# Patient Record
Sex: FEMALE | Race: WHITE
[De-identification: names, ages, dates, MRNs, and addresses within clinical notes are randomized per-mention and may not be internally consistent; named-entity substitution may affect disease eponyms.]

---

## 2017-11-05 ENCOUNTER — HOSPITAL ENCOUNTER (INPATIENT)
Dept: HOSPITAL 83 - ED | Age: 52
LOS: 2 days | Discharge: HOME | DRG: 392 | End: 2017-11-07
Attending: INTERNAL MEDICINE | Admitting: INTERNAL MEDICINE
Payer: COMMERCIAL

## 2017-11-05 VITALS — SYSTOLIC BLOOD PRESSURE: 143 MMHG | DIASTOLIC BLOOD PRESSURE: 88 MMHG

## 2017-11-05 VITALS — DIASTOLIC BLOOD PRESSURE: 92 MMHG

## 2017-11-05 VITALS
DIASTOLIC BLOOD PRESSURE: 82 MMHG | HEIGHT: 65.98 IN | BODY MASS INDEX: 21.91 KG/M2 | SYSTOLIC BLOOD PRESSURE: 134 MMHG | WEIGHT: 136.31 LBS

## 2017-11-05 VITALS — DIASTOLIC BLOOD PRESSURE: 79 MMHG | SYSTOLIC BLOOD PRESSURE: 123 MMHG

## 2017-11-05 DIAGNOSIS — Z87.442: ICD-10-CM

## 2017-11-05 DIAGNOSIS — E89.0: ICD-10-CM

## 2017-11-05 DIAGNOSIS — Z98.51: ICD-10-CM

## 2017-11-05 DIAGNOSIS — C44.509: ICD-10-CM

## 2017-11-05 DIAGNOSIS — N39.0: ICD-10-CM

## 2017-11-05 DIAGNOSIS — R19.00: ICD-10-CM

## 2017-11-05 DIAGNOSIS — Z83.3: ICD-10-CM

## 2017-11-05 DIAGNOSIS — K63.89: ICD-10-CM

## 2017-11-05 DIAGNOSIS — Z72.89: ICD-10-CM

## 2017-11-05 DIAGNOSIS — Z90.49: ICD-10-CM

## 2017-11-05 DIAGNOSIS — Z79.899: ICD-10-CM

## 2017-11-05 DIAGNOSIS — Z82.49: ICD-10-CM

## 2017-11-05 DIAGNOSIS — D72.819: ICD-10-CM

## 2017-11-05 DIAGNOSIS — Z83.49: ICD-10-CM

## 2017-11-05 DIAGNOSIS — C73: ICD-10-CM

## 2017-11-05 DIAGNOSIS — R10.9: Primary | ICD-10-CM

## 2017-11-05 LAB
ALBUMIN SERPL-MCNC: 4.2 GM/DL (ref 3.1–4.5)
ALP SERPL-CCNC: 87 U/L (ref 45–117)
ALT SERPL W P-5'-P-CCNC: 18 U/L (ref 12–78)
APPEARANCE UR: CLEAR
AST SERPL-CCNC: 17 IU/L (ref 3–35)
BACTERIA #/AREA URNS HPF: (no result) /[HPF]
BASOPHILS # BLD AUTO: 0 10*3/UL (ref 0–0.1)
BASOPHILS NFR BLD AUTO: 0.6 % (ref 0–1)
BILIRUB UR QL STRIP: NEGATIVE
BUN SERPL-MCNC: 16 MG/DL (ref 7–24)
CHLORIDE SERPL-SCNC: 104 MMOL/L (ref 98–107)
COLOR UR: YELLOW
CREAT SERPL-MCNC: 0.95 MG/DL (ref 0.55–1.02)
EOSINOPHIL # BLD AUTO: 0.1 10*3/UL (ref 0–0.4)
EOSINOPHIL # BLD AUTO: 1.3 % (ref 1–4)
EPI CELLS #/AREA URNS HPF: (no result) /[HPF]
ERYTHROCYTE [DISTWIDTH] IN BLOOD BY AUTOMATED COUNT: 11.9 % (ref 0–14.5)
GLUCOSE UR QL: NEGATIVE
HCT VFR BLD AUTO: 45.8 % (ref 37–47)
HGB BLD-MCNC: 14.6 G/DL (ref 12–16)
HGB UR QL STRIP: NEGATIVE
KETONES UR QL STRIP: NEGATIVE
LEUKOCYTE ESTERASE UR QL STRIP: (no result)
LYMPHOCYTES # BLD AUTO: 1.9 10*3/UL (ref 1.3–4.4)
LYMPHOCYTES NFR BLD AUTO: 30.5 % (ref 27–41)
MCH RBC QN AUTO: 30.2 PG (ref 27–31)
MCHC RBC AUTO-ENTMCNC: 31.9 G/DL (ref 33–37)
MCV RBC AUTO: 94.6 FL (ref 81–99)
MONOCYTES # BLD AUTO: 0.5 10*3/UL (ref 0.1–1)
MONOCYTES NFR BLD MANUAL: 8 % (ref 3–9)
NEUT #: 3.7 10*3/UL (ref 2.3–7.9)
NEUT %: 59.3 % (ref 47–73)
NITRITE UR QL STRIP: NEGATIVE
NRBC BLD QL AUTO: 0 10*3/UL (ref 0–0)
PH UR STRIP: 6 [PH] (ref 5–9)
PLATELET # BLD AUTO: 210 10*3/UL (ref 130–400)
PMV BLD AUTO: 11.1 FL (ref 9.6–12.3)
POTASSIUM SERPL-SCNC: 4 MMOL/L (ref 3.5–5.1)
PROT SERPL-MCNC: 7.8 GM/DL (ref 6.4–8.2)
RBC # BLD AUTO: 4.84 10*6/UL (ref 4.1–5.1)
RBC #/AREA URNS HPF: (no result) RBC/HPF (ref 0–2)
SODIUM SERPL-SCNC: 140 MMOL/L (ref 136–145)
SP GR UR: 1.01 (ref 1–1.03)
UROBILINOGEN UR STRIP-MCNC: 0.2 E.U./DL (ref 0.2–1)
WBC NRBC COR # BLD AUTO: 6.2 10*3/UL (ref 4.8–10.8)

## 2017-11-05 PROCEDURE — 0DJD8ZZ INSPECTION OF LOWER INTESTINAL TRACT, VIA NATURAL OR ARTIFICIAL OPENING ENDOSCOPIC: ICD-10-PCS | Performed by: INTERNAL MEDICINE

## 2017-11-05 NOTE — CON
[place], [place]
 
                                 REPORT OF CONSULTATION
 
        NAME: LASHAE SORENSEN                 ACCT #: [id number]  
        UNIT #: [id number]                         ROOM: 522       
        DOCTOR: LENA VANN MD              BIRTHDATE: [birth year]
 
 
DOS: 11/06/2017
 
HISTORY OF PRESENT ILLNESS:  This is a 51-year-old patient who presented with
chief complaint of lower abdominal pain for 2 days.  The patient was admitted to
the Emergency Room.  A CT scan of the abdomen was obtained.  The patient was
suspected to have a mass in right colon.  I have been asked for assessment of
the patient in this regard.
 
PAST MEDICAL HISTORY:  Associated hypothyroidism.
 
PAST SURGICAL HISTORY:  Thyroidectomy and appendectomy.
 
ALLERGIES:  No known medication.
 
SOCIAL HISTORY:  Nonsmoker and social alcohol consumer.
 
FAMILY HISTORY:  Noncontributory.
 
REVIEW OF SYSTEMS:
HEENT:  Denies double vision, blurred vision.
RESPIRATORY:  Denies acute shortness of breath.
CARDIOVASCULAR:  Denies acute chest pain.
DIGESTIVE SYSTEM:  As identified above.  No changes in bowel habit.  No
hematemesis, no hematochezia.
 
PHYSICAL EXAMINATION:
VITAL SIGNS:  Stable.
HEENT:  Within normal limits.
NECK:  Supple, no thyromegaly, no cervical lymphadenopathy.
CHEST:  Symmetric anatomy, equal expansion.  No wheeze.  No rhonchi.
HEART:  Normal sinus rhythm, no gallop, no murmur.
ABDOMEN:  Soft.  No hepato-organomegaly.  Bowel sounds within normal limits.
EXTREMITIES:  No cyanosis, no pedal edema.
NEUROLOGIC:  Alert, oriented to time, place and person.
 
IMPRESSION:  Abnormal CT scan report mass seen at the level of the cecum,
malignancy has been of concern and the colonoscopy was recommended; therefore,
we are consulted and answer was needed today.  Labs reviewed and records
reviewed.  Her hCG was negative.  H and H were stable.  Her urine cultures
showed no growth.
 
IMPRESSION AND PLAN:  Ruling out mass in cecum, hypothyroidism.  Labs reviewed. 
Medications reviewed, records reviewed.  Plan for colonoscopy today.
 
 
 
 
                              [place], [place]
 
                                 REPORT OF CONSULTATION
 
        NAME: LASHAE SORENSEN                 ACCT #: [id number]  
        UNIT #: [id number]                         ROOM: 522       
        DOCTOR: LENA VANN MD              BIRTHDATE: [birth year]
 
 
_________________________________
LENA VANN MD
 
CM:CONSTR:REPORT OF CONSULTATION
 
D: 11/06/17 1701   T: 11/07/17 11/13/17 1710                                          interface

## 2017-11-05 NOTE — NUR
SPOKE TO  AT THIS TIME REGARDING CONSULT. DISCUSSED RESULTS OF CT OF
ABD/PELVIS INCLUDING CECAL MASS MEASURING 2.7 X 1.8 X 4.8 CM. ALSO DISCUSSED
LABS AND HOME MEDICATIONS. INSTRUCTED TO CALL  WITH RESULTS OF URINE
CULTURE WHEN IT COMES BACK.  AWARE THAT PRELIMINARY CULTURE RESULTS
GENERALLY TAKE 48 HOURS TO RETURN.

## 2017-11-05 NOTE — NUR
Time: 2100
A 51  year old FEMALE admitted to 5E
under services of CECIL SQUIRES DO.
Pt. arrived via wheel chair from
ER.  Chief complaint: ABDOMINAL PAIN, ABDOMINAL MASS.
 
LADAN BRASWELL

## 2017-11-05 NOTE — NUR
PT MEDICATED WITH PO NORCO PER PRN ORDER FOR C/O LOWER ABDOMINAL PAIN 7/10.
PATIENT STATES FEELING IS A CONSTANT DULL ACHE/UNCOMFORTABLE FEELING WITH
INTERMITTENT SHARP PAINS. WILL MONITOR EFFECTIVENESS OF MEDICATIONS. IV
DILAUDID OFFERED PER PRN ORDER, BUT PATIENT STATES SHE DOES NOT WANT TO TAKE
SOMETHING SO STRONG AT THIS TIME. CALL LIGHT LEFT IN REACH.

## 2017-11-05 NOTE — NUR
PATIENT RESTING IN BED WITH FAMILY AT BEDSIDE STATES NO CHANGE IN ABDOMINAL
PAIN, PT A&O X3, DENIES NAUSEA OR VOMITING, CONSTANT SHARP PAIN THAT VARIES IN
INTENCITY

## 2017-11-05 NOTE — NUR
PATIENT STATES EARLIER MEDICATION WAS EFFECTIVE. WILL CONTINUE TO MONITOR.
CALL LIGHT LEFT IN REACH.

## 2017-11-06 VITALS — SYSTOLIC BLOOD PRESSURE: 130 MMHG | DIASTOLIC BLOOD PRESSURE: 88 MMHG

## 2017-11-06 VITALS — SYSTOLIC BLOOD PRESSURE: 120 MMHG | DIASTOLIC BLOOD PRESSURE: 69 MMHG

## 2017-11-06 VITALS — SYSTOLIC BLOOD PRESSURE: 123 MMHG | DIASTOLIC BLOOD PRESSURE: 79 MMHG

## 2017-11-06 VITALS — DIASTOLIC BLOOD PRESSURE: 69 MMHG | SYSTOLIC BLOOD PRESSURE: 125 MMHG

## 2017-11-06 VITALS — DIASTOLIC BLOOD PRESSURE: 60 MMHG | SYSTOLIC BLOOD PRESSURE: 90 MMHG

## 2017-11-06 VITALS — SYSTOLIC BLOOD PRESSURE: 112 MMHG | DIASTOLIC BLOOD PRESSURE: 70 MMHG

## 2017-11-06 VITALS — DIASTOLIC BLOOD PRESSURE: 73 MMHG

## 2017-11-06 VITALS — DIASTOLIC BLOOD PRESSURE: 76 MMHG

## 2017-11-06 VITALS — DIASTOLIC BLOOD PRESSURE: 70 MMHG

## 2017-11-06 LAB
25(OH)D3 SERPL-MCNC: 39 NG/ML (ref 30–100)
APTT PPP: 25.3 SECONDS (ref 20.8–31.5)
BASOPHILS # BLD AUTO: 0 10*3/UL (ref 0–0.1)
BASOPHILS NFR BLD AUTO: 0.7 % (ref 0–1)
BUN SERPL-MCNC: 15 MG/DL (ref 7–24)
CHLORIDE SERPL-SCNC: 103 MMOL/L (ref 98–107)
CHOLEST SERPL-MCNC: 175 MG/DL (ref ?–200)
CREAT SERPL-MCNC: 0.95 MG/DL (ref 0.55–1.02)
EOSINOPHIL # BLD AUTO: 0.1 10*3/UL (ref 0–0.4)
EOSINOPHIL # BLD AUTO: 2.4 % (ref 1–4)
ERYTHROCYTE [DISTWIDTH] IN BLOOD BY AUTOMATED COUNT: 11.9 % (ref 0–14.5)
HCT VFR BLD AUTO: 41.6 % (ref 37–47)
HDLC SERPL-MCNC: 58 MG/DL (ref 40–60)
HGB BLD-MCNC: 13.3 G/DL (ref 12–16)
INR BLD: 1 (ref 2–3.5)
LDLC SERPL DIRECT ASSAY-MCNC: 105 MG/DL (ref 9–159)
LYMPHOCYTES # BLD AUTO: 1.6 10*3/UL (ref 1.3–4.4)
LYMPHOCYTES NFR BLD AUTO: 38.3 % (ref 27–41)
MCH RBC QN AUTO: 30 PG (ref 27–31)
MCHC RBC AUTO-ENTMCNC: 32 G/DL (ref 33–37)
MCV RBC AUTO: 93.7 FL (ref 81–99)
MONOCYTES # BLD AUTO: 0.4 10*3/UL (ref 0.1–1)
MONOCYTES NFR BLD MANUAL: 9.7 % (ref 3–9)
NEUT #: 2 10*3/UL (ref 2.3–7.9)
NEUT %: 48.7 % (ref 47–73)
NRBC BLD QL AUTO: 0 % (ref 0–0)
PLATELET # BLD AUTO: 173 10*3/UL (ref 130–400)
PMV BLD AUTO: 11.7 FL (ref 9.6–12.3)
POTASSIUM SERPL-SCNC: 4 MMOL/L (ref 3.5–5.1)
RBC # BLD AUTO: 4.44 10*6/UL (ref 4.1–5.1)
SODIUM SERPL-SCNC: 140 MMOL/L (ref 136–145)
TRIGL SERPL-MCNC: 58 MG/DL (ref ?–150)
TSH SERPL DL<=0.005 MIU/L-ACNC: 0.06 UIU/ML (ref 0.36–4.75)
VITAMIN B12: 365 PG/ML (ref 247–911)
VLDLC SERPL CALC-MCNC: 12 MG/DL (ref 6–40)
WBC NRBC COR # BLD AUTO: 4.1 10*3/UL (ref 4.8–10.8)

## 2017-11-06 NOTE — NUR
in to talk to patient.
Patient states lives at home with family.
There are no steps in the home.
Physician: ramos smith
Pharmacy: [facility]
Home health services: none
Patient's level of ADLs: INDEPENDENT
Patient has working utilities: all working
DME: none
Follow-up physician's appointment after d/c: will be made by hospitalist nurse
director upon discharge
Does patient want to access PORTAL?: no
Discharge plan discussed with patient patient lives at home, is independent in
adls and ambulation, patient will be going home when able and denies any home
needs.
 
WAYLON CANDELARIA

## 2017-11-06 NOTE — NUR
RESTING QUIETLY, DILAUDID EFFECTIVE FOR PAIN BUT PT STATES SHE DID NOT LIKE
THE WAY IT MADE HERE FEEL.  DR QUIÑONEZ HAS PUT ORDER IN FOR TORODAL.

## 2017-11-06 NOTE — NUR
MEDICATED IV DILAUDID FOR C/O ABDOMINAL PAIN.  RATES PAIN AS 8/10.  PT
IMMEDIATELY STATES HER HEAD FEELS FUNNY AND LIGHTHEADINESS.  /50.  WILL
CONTINUE TO MONITOR.

## 2017-11-06 NOTE — NUR
SPOKE TO  AT THIS TIME.  WANTS PATIENT TO HAVE 25 MG DULCOLAX
PO NOW, FOLLOWED  MG MIRALAX PREP IN 2L OF GATORADE. ALSO INSTRUCTED TO
HOLD ALL BLOOD THINNERS, KEEP PT NPO AFTER MIRALAX PREP, AND CALL  BY
11 AM REGARDING RESULTS OF COLO PREP. POSSIBLE COLO DEPENDING ON RESPONSE TO
PREP.

## 2017-11-06 NOTE — NUR
DR VANN NOTIFIED THAT PT IS ALMOST FINISHED WITH COLO PREP AND IS HAVING
DIARRHEA THAT IS GETTING LIGHTER IN COLOR PER PT.

## 2017-11-07 VITALS — SYSTOLIC BLOOD PRESSURE: 125 MMHG | DIASTOLIC BLOOD PRESSURE: 74 MMHG

## 2017-11-07 VITALS — DIASTOLIC BLOOD PRESSURE: 86 MMHG

## 2017-11-07 VITALS — DIASTOLIC BLOOD PRESSURE: 56 MMHG

## 2017-11-07 VITALS — DIASTOLIC BLOOD PRESSURE: 72 MMHG

## 2017-11-07 LAB
ALBUMIN SERPL-MCNC: 3.6 GM/DL (ref 3.1–4.5)
ALP SERPL-CCNC: 69 U/L (ref 45–117)
ALT SERPL W P-5'-P-CCNC: 16 U/L (ref 12–78)
AST SERPL-CCNC: 14 IU/L (ref 3–35)
BASOPHILS # BLD AUTO: 0 10*3/UL (ref 0–0.1)
BASOPHILS NFR BLD AUTO: 0.7 % (ref 0–1)
BUN SERPL-MCNC: 15 MG/DL (ref 7–24)
CHLORIDE SERPL-SCNC: 105 MMOL/L (ref 98–107)
CREAT SERPL-MCNC: 0.82 MG/DL (ref 0.55–1.02)
EOSINOPHIL # BLD AUTO: 0.1 10*3/UL (ref 0–0.4)
EOSINOPHIL # BLD AUTO: 2.7 % (ref 1–4)
ERYTHROCYTE [DISTWIDTH] IN BLOOD BY AUTOMATED COUNT: 11.7 % (ref 0–14.5)
HCT VFR BLD AUTO: 41.5 % (ref 37–47)
HGB BLD-MCNC: 13.5 G/DL (ref 12–16)
LYMPHOCYTES # BLD AUTO: 1.3 10*3/UL (ref 1.3–4.4)
LYMPHOCYTES NFR BLD AUTO: 31.5 % (ref 27–41)
MCH RBC QN AUTO: 30.7 PG (ref 27–31)
MCHC RBC AUTO-ENTMCNC: 32.5 G/DL (ref 33–37)
MCV RBC AUTO: 94.3 FL (ref 81–99)
MONOCYTES # BLD AUTO: 0.3 10*3/UL (ref 0.1–1)
MONOCYTES NFR BLD MANUAL: 8 % (ref 3–9)
NEUT #: 2.3 10*3/UL (ref 2.3–7.9)
NEUT %: 56.9 % (ref 47–73)
NRBC BLD QL AUTO: 0 10*3/UL (ref 0–0)
PLATELET # BLD AUTO: 174 10*3/UL (ref 130–400)
PMV BLD AUTO: 11.4 FL (ref 9.6–12.3)
POTASSIUM SERPL-SCNC: 4.7 MMOL/L (ref 3.5–5.1)
PROT SERPL-MCNC: 6.6 GM/DL (ref 6.4–8.2)
RBC # BLD AUTO: 4.4 10*6/UL (ref 4.1–5.1)
SODIUM SERPL-SCNC: 139 MMOL/L (ref 136–145)
WBC NRBC COR # BLD AUTO: 4.1 10*3/UL (ref 4.8–10.8)

## 2017-11-07 NOTE — NUR
DR VANN CALLED WITH PELVIC US RESULTS. NO NEW ORDERS. OK TO DC FROM HIM.
FOLLOW UP IN OFFICE AS OUT PATIENT.

## 2017-11-07 NOTE — NUR
Patient resting quietly with no c/o discomfort. Respirations easy and regular.
Vital signs stable. No overt distress.
TARYN BEACH R

## 2017-11-07 NOTE — NUR
DR VANN CALLED AND NOTIFIED OF URINE CULTURE RESULT. UPDATED THAT PT
TOLERATED DIET WITHOUT PROBLEM AND ORDERING OF PELVIC US FOR TODAY. DR VANN
WANTS CALLED WITH RESULT.

## 2017-11-07 NOTE — NUR
Discharge instructions reviewed with patient/family. Patient receptive and
verbalizes understanding. Follow-up care arranged. Written instructions given
to patient/family.
TARYN BEACH

## 2017-11-07 NOTE — NUR
DR QUIÑONEZ NOTIFIED OF PATIENTS IV BEING PULLED OUT AND PT REQUEST AT THIS TIME
NOT TO HAVE ANOTHER IV PLACED. DR QUIÑONEZ STATES THAT IS OK.

## 2017-12-08 ENCOUNTER — HOSPITAL ENCOUNTER (OUTPATIENT)
Dept: HOSPITAL 83 - LAB | Age: 52
Discharge: HOME | End: 2017-12-08
Attending: INTERNAL MEDICINE
Payer: COMMERCIAL

## 2017-12-08 DIAGNOSIS — E89.0: ICD-10-CM

## 2017-12-08 DIAGNOSIS — C73: Primary | ICD-10-CM

## 2018-12-12 ENCOUNTER — HOSPITAL ENCOUNTER (OUTPATIENT)
Dept: HOSPITAL 83 - LAB | Age: 53
Discharge: HOME | End: 2018-12-12
Attending: INTERNAL MEDICINE
Payer: COMMERCIAL

## 2018-12-12 DIAGNOSIS — E89.0: ICD-10-CM

## 2018-12-12 DIAGNOSIS — C73: Primary | ICD-10-CM

## 2019-12-11 ENCOUNTER — HOSPITAL ENCOUNTER (OUTPATIENT)
Dept: HOSPITAL 83 - LAB | Age: 54
Discharge: HOME | End: 2019-12-11
Attending: INTERNAL MEDICINE
Payer: COMMERCIAL

## 2019-12-11 DIAGNOSIS — C73: Primary | ICD-10-CM

## 2019-12-11 DIAGNOSIS — E89.0: ICD-10-CM

## 2019-12-13 ENCOUNTER — HOSPITAL ENCOUNTER (OUTPATIENT)
Dept: HOSPITAL 83 - US | Age: 54
Discharge: HOME | End: 2019-12-13
Attending: INTERNAL MEDICINE
Payer: COMMERCIAL

## 2019-12-13 DIAGNOSIS — R10.84: Primary | ICD-10-CM

## 2020-06-21 ENCOUNTER — HOSPITAL ENCOUNTER (EMERGENCY)
Dept: HOSPITAL 83 - ED | Age: 55
Discharge: HOME | End: 2020-06-21
Payer: COMMERCIAL

## 2020-06-21 VITALS — HEIGHT: 55 IN

## 2020-06-21 VITALS — DIASTOLIC BLOOD PRESSURE: 88 MMHG
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
## 2020-06-21 DIAGNOSIS — J45.909: ICD-10-CM

## 2020-06-21 DIAGNOSIS — S01.452A: Primary | ICD-10-CM

## 2020-06-21 DIAGNOSIS — Z79.899: ICD-10-CM

## 2020-06-21 DIAGNOSIS — Z79.2: ICD-10-CM
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
## 2021-04-22 ENCOUNTER — HOSPITAL ENCOUNTER (OUTPATIENT)
Dept: HOSPITAL 83 - LAB | Age: 56
Discharge: HOME | End: 2021-04-22
Attending: INTERNAL MEDICINE
Payer: COMMERCIAL

## 2021-04-22 DIAGNOSIS — C73: Primary | ICD-10-CM

## 2021-04-22 DIAGNOSIS — E89.0: ICD-10-CM

## 2021-04-28 LAB — THYROGLOB AB SERPL-ACNC: <1 IU/ML

## 2022-09-16 ENCOUNTER — HOSPITAL ENCOUNTER (OUTPATIENT)
Dept: HOSPITAL 83 - LAB | Age: 57
Discharge: HOME | End: 2022-09-16
Attending: INTERNAL MEDICINE
Payer: COMMERCIAL

## 2022-09-16 DIAGNOSIS — E89.0: ICD-10-CM

## 2022-09-16 DIAGNOSIS — C73: Primary | ICD-10-CM

## 2024-07-24 NOTE — O
[place], [place]
 
                                      OPERATIVE NOTE
 
        NAME: LASHAE SORENSEN                ACCT #: [id number]  
        UNIT #: [id number]                        ROOM: 522       
        DOCTOR: LENA VANN MD             BIRTHDATE: [birth year]
 
 
DOS: 11/06/2017
 
INDICATIONS:  This is a 51-year-old patient who presented with chief complaint
of abdominal pain, had a CT scan of the abdomen and was suspected to have
malignancy in the cecum.
 
PROCEDURE:  Today's procedure part of investigation is colonoscopy.
 
PREMEDICATION:  Versed and Diprivan.
 
SCOPE:  Olympus forward viewing colonoscope 10L video.
 
DESCRIPTION OF PROCEDURE:  After putting the patient in the left lateral
position and after application of lubricant to the scope, the scope was
introduced.  Thereafter, under direct visualization, I advanced through the
length of colon with some difficulty.  Difficulty being extreme tortuosity of
the colon.  Meticulous care had to be given to maneuver throughout all this
tortuosity.  Finally base of the cecum explored, appendiceal orifice identified,
ileocecal valve was photographed.  The scope was gradually withdrawn.  There was
no colonic mass in the cecum.  Air was suctioned out.  The patient was
extubated, tolerated procedure well.
 
IMPRESSION:  Tortuous colon, no cecal mass.
 
PLAN AND DISCUSSION:  Regular diet.
 
ACTIVITY:  Ad shayla.
 
Thank you very much indeed.  Workup for pelvic pain continues, perhaps UTI has
to be ruled out.
 
 
 
_________________________________
LENA VANN MD
 
CM:OPRECORD:OPERATIVE NOTE
 
D: 11/06/17 1739
T: 11/06/17 1803
    
                                                            
LENA VANN MD            
                                                            
11/13/17
1711
                              
[facility] Note    Chief Complaint   Patient presents with    Pre-Op Exam     Pre-op exam for left ankle ORIF  with Dr. Zaragoza on 7/25/24    Office Visit     HISTORY OF PRESENT ILLNESS     Patient is a 43 year old male who presents for pre-operative evaluation.  On 7/25/24 at [facility], patient is having:    Surgeon Role   Vinny Zaragoza, DO Primary    Procedure Laterality Anesthesia   ORIF, ANKLE LEFT Left General        No reported concerns today.  Denied any cardiovascular symptoms at rest or with activity. Patient reports being able to walk up multiple flights of stairs and multiple blocks without cardiovascular symptoms.  No use of inhalers, does not smoke. Denied any personal or family history of issues with anesthesia. Taking medications as prescribed without any significant side effects. Has DANIEL that is untreated, working on getting a mouthpiece.     MEDICAL, FAMILY AND SOCIAL HISTORY     Problem List:  Patient Active Problem List   Diagnosis    Abdominal contusion    Cervical radicular pain    Low back strain    Sprain of medial collateral ligament of right knee    Strain of right patellar tendon    Acute pain of right knee    Olecranon bursitis of right elbow    Benign neuroendocrine tumor of appendix (CMD)    Hypertrophy of inferior nasal turbinate    Male hypogonadism    High triglycerides    Essential hypertension    Snoring    Nasal septal deviation    Maxillary sinusitis, chronic    Anxiety    Fatigue    Leg edema     Past Medical/Surgical History:  Past Medical History:   Diagnosis Date    Elevated hemoglobin (CMD) 7/22/2022    GERD (gastroesophageal reflux disease)     EGD done on 2/13/13    History of multiple concussions        Past Surgical History:   Procedure Laterality Date    Appendectomy      Rotator cuff repair      Right 1998/2005    Shoulder surgery Right     Spine surgery         Family History:  Family History   Problem Relation Age of Onset     Osteoarthritis Mother     Other Paternal Grandfather         brain tumor       Social History:  Social History     Tobacco Use    Smoking status: Former     Current packs/day: 0.00     Average packs/day: 1.5 packs/day for 13.7 years (20.6 ttl pk-yrs)     Types: Cigarettes     Start date:      Quit date: 2011     Years since quittin.8    Smokeless tobacco: Current     Types: Chew    Tobacco comments:     Currently chewing tobacco half a can per day (was chewing 2-3 cans a day before). Neck 50.5cm.   Substance Use Topics    Alcohol use: Yes     Alcohol/week: 2.0 - 3.0 standard drinks of alcohol     Types: 2 - 3 Standard drinks or equivalent per week     Medications:  Current Outpatient Medications   Medication Sig Dispense Refill    pantoprazole (PROTONIX) 40 MG tablet Take 1 tablet by mouth daily. 1 tablet 0    hydrOXYzine (ATARAX) 10 MG tablet Take 1 tablet by mouth every 8 hours as needed for Anxiety. 75 tablet 0    oxyCODONE, IMM REL, (ROXICODONE) 5 MG immediate release tablet Take 1 tablet by mouth every 6 hours as needed for Pain. 12 tablet 0    meloxicam (MOBIC) 15 MG tablet Take 1 tablet by mouth daily. 30 tablet 0    hydroCHLOROthiazide 25 MG tablet Take 1 tablet by mouth daily. 90 tablet 0    rosuvastatin (CRESTOR) 20 MG tablet Take 1 tablet by mouth daily. (Patient not taking: Reported on 2024) 90 tablet 3    testosterone cypionate (DEPO-TESTOSTERONE) 200 MG/ML injectable solution Inject 100 mg into the muscle every 7 days.       No current facility-administered medications for this visit.     Allergies:  ALLERGIES:   Allergen Reactions    Vancomycin RASH and PRURITUS       REVIEW OF SYSTEMS     Review of Systems    GENERAL / CONSTITUTIONAL:  [x]  YES    []  NO   Excessive fatigue.  []  YES    [x]  NO   Unexplained weight loss   []  YES    []  NO   Have you traveled outside of the [place] in the past year?   If so, where:     EARS, NOSE, MOUTH AND THROAT:  []  YES    [x]  NO   Hoarseness or  voice change.  []  YES    [x]  NO   Difficult or painful swallowing.    HEART:  []  YES    [x]  NO   Chest pain  []  YES    [x]  NO   Palpitation or irregular heart beat.    LUNGS:   []  YES    [x]  NO   Coughing up blood.   []  YES    [x]  NO   Chronic cough.  []  YES    [x]  NO   Wheezing.  []  YES    [x]  NO   Shortness of Breath    INTESTINAL SYSTEM:  []  YES    [x]  NO   Tarry (black) stools.  []  YES    [x]  NO   Recurrent abdominal pain.  []  YES    [x]  NO   Frequent nausea or vomiting.    URINARY SYSTEM:   []  YES    [x]  NO   Difficult or painful urination.  []  YES    [x]  NO   Urination more than once a night.  []  YES    [x]  NO   Bloody Urine    SKELETON AND JOINTS:  [x]  YES    []  NO   Swollen or painful joints.   []  YES    [x]  NO   Gout.   Which joints:     SKIN:  []  YES    [x]  NO   Recurrent skin rash.   []  YES    [x]  NO   Moles that have changed in size or color.    NERVOUS SYSTEM:   []  YES    [x]  NO   Frequent or severe headaches.  []  YES    [x]  NO   Loss of consciousness/concussion.  []  YES    [x]  NO   Weakness or recurrent numbness or tingling in your arms or legs.    PSYCHIATRIC:  Depression Screening  Over the last two weeks, how often have you been bothered by any of the following problems?  []  YES    [x]  NO   Little interest or pleasure in doing things.    [x]  YES    []  NO   Feeling down, depressed or hopeless.   [x]  YES    []  NO   Feeling nervous, anxious or on edge.  []  YES    [x]  NO   Not being able to stop or control worrying.     ENDOCRINE:  []  YES    [x]  NO   History of diabetes.  []  YES    [x]  NO   History of thyroid disease.     HEMATOLOGIC:   []  YES    [x]  NO   Swollen glands or lymph nodes.  []  YES    [x]  NO   History of anemia.   []  YES    [x]  NO   History of blood clots.    IMMUNE SYSTEM:  []  YES    [x]  NO   History of AIDS.  []  YES    [x]  NO   Asthma.    MALE HEALTH UPDATE:  [x]  YES    []  NO   Any problem with sexual function?  []  YES    []   NO   Weak urine stream.       PHYSICAL EXAM   Vitals:   Visit Vitals  BP (!) 130/90 (BP Location: RUE - Right upper extremity, Patient Position: Sitting, Cuff Size: Large Adult)   Pulse 89   Temp 99 °F (37.2 °C) (Temporal)   SpO2 96%     Physical Exam  Vitals and nursing note reviewed.   Constitutional:       General: He is not in acute distress.     Appearance: Normal appearance. He is not ill-appearing.   HENT:      Head: Normocephalic and atraumatic.      Right Ear: Hearing, tympanic membrane, ear canal and external ear normal.      Left Ear: Hearing, tympanic membrane, ear canal and external ear normal.      Nose: Nose normal.      Mouth/Throat:      Mouth: Mucous membranes are moist. No oral lesions.      Dentition: Normal dentition. No gum lesions.      Tongue: No lesions.      Palate: No mass.      Pharynx: Oropharynx is clear. Uvula midline.      Neck: Full passive range of motion without pain, normal range of motion and neck supple.   Eyes:      General: Lids are normal. Vision grossly intact. Gaze aligned appropriately.      Extraocular Movements: Extraocular movements intact.      Conjunctiva/sclera: Conjunctivae normal.      Pupils: Pupils are equal, round, and reactive to light.   Neck:      Thyroid: No thyroid mass, thyromegaly or thyroid tenderness.      Vascular: No carotid bruit.      Trachea: Trachea and phonation normal.   Cardiovascular:      Rate and Rhythm: Normal rate and regular rhythm.      Pulses: Normal pulses.      Heart sounds: Normal heart sounds.   Pulmonary:      Effort: Pulmonary effort is normal.      Breath sounds: Normal breath sounds and air entry.   Chest:      Chest wall: No deformity.   Abdominal:      General: Abdomen is flat. Bowel sounds are normal. There is no distension.      Palpations: Abdomen is soft. There is no hepatomegaly, splenomegaly or mass.      Tenderness: There is no abdominal tenderness. There is no guarding or rebound.      Hernia: No hernia is present.    Musculoskeletal:         General: Signs of injury present.      Right lower leg: Edema present.      Left lower leg: Edema present.      Comments: Trace LE edema.   Lymphadenopathy:      Head:      Right side of head: No submental, submandibular, tonsillar or preauricular adenopathy.      Left side of head: No submental, submandibular, tonsillar or preauricular adenopathy.      Cervical: No cervical adenopathy.      Right cervical: No superficial or posterior cervical adenopathy.     Left cervical: No superficial or posterior cervical adenopathy.      Upper Body:      Right upper body: No supraclavicular or axillary adenopathy.      Left upper body: No supraclavicular or axillary adenopathy.   Skin:     General: Skin is warm and moist.      Capillary Refill: Capillary refill takes less than 2 seconds.      Coloration: Skin is not jaundiced.      Findings: No bruising, signs of injury or rash.   Neurological:      General: No focal deficit present.      Mental Status: He is alert.      Cranial Nerves: No cranial nerve deficit.      Sensory: Sensation is intact.      Motor: Motor function is intact. No weakness.      Coordination: Coordination is intact.      Gait: Gait is intact.      Deep Tendon Reflexes: Reflexes are normal and symmetric.      Comments: Alert and oriented to person, place, time, situation.      Psychiatric:         Attention and Perception: Attention and perception normal.         Mood and Affect: Mood and affect normal.         Speech: Speech normal.         Behavior: Behavior normal.         Thought Content: Thought content normal.         Cognition and Memory: Cognition and memory normal.         Judgment: Judgment normal.       ASSESSMENT/PLAN     Patient is a 43 year old male who presents for pre-operative evaluation.  On 7/25/24 at [facility], patient is having:    Surgeon Role   Vinny Zaragoza, DO Primary    Procedure Laterality Anesthesia   ORIF, ANKLE LEFT Left  General        1. Pre-op evaluation  Patient doing well aside from untreated DANIEL and some low mood, anxiety at times.  Discussed that the untreated DANIEL puts him at higher risk for complications from anesthesia/surgery but is not an absolute contraindication to surgery.  Labs aside from PSA, hepatitis C, glycohemoglobin have resulted and look okay so patient is cleared for surgery without any further workup.  Discussed risks in general of surgery.  Discussed medication instructions.  - CBC with Automated Differential; Future  - Comprehensive Metabolic Panel; Future  - NT proBNP; Future    2. Anxiety  Chronic, patient thinks he may benefit from some treatment, he would like to get his sleep apnea under control before he does any sort of daily medication or therapy, will prescribe hydroxyzine in the meantime and have him start this after the surgery.  - hydrOXYzine (ATARAX) 10 MG tablet; Take 1 tablet by mouth every 8 hours as needed for Anxiety.  Dispense: 75 tablet; Refill: 0    3. Fatigue, unspecified type  Likely multifactorial with mood and untreated sleep apnea contributing.  - Ferritin; Future  - Iron And total Iron Binding Capacity; Future    4. Leg edema  Trace approximately. BNP given untreated DANIEL as well.   - NT proBNP; Future    5. Need for hepatitis C screening test  - Hepatitis C Antibody With Reflex; Future    6. Healthcare maintenance  - Hepatitis C Antibody With Reflex; Future          Discussed healthy diet and life style in general. Discussed medications (including side effects and proper use), return precautions, and plan of care. All questions answered, patient in agreement with plan.     Return for As directed by your primary provider, Labs today.         Kevin Melara MD  Family Medicine

## 2025-04-22 ENCOUNTER — HOSPITAL ENCOUNTER (OUTPATIENT)
Dept: HOSPITAL 83 - LAB | Age: 60
Discharge: HOME | End: 2025-04-22
Attending: INTERNAL MEDICINE
Payer: COMMERCIAL

## 2025-04-22 DIAGNOSIS — E89.0: Primary | ICD-10-CM

## 2025-04-22 LAB — T4 FREE SERPL-MCNC: 1.66 NG/DL (ref 0.89–1.76)
